# Patient Record
Sex: FEMALE | Race: WHITE | NOT HISPANIC OR LATINO | Employment: UNEMPLOYED | URBAN - METROPOLITAN AREA
[De-identification: names, ages, dates, MRNs, and addresses within clinical notes are randomized per-mention and may not be internally consistent; named-entity substitution may affect disease eponyms.]

---

## 2021-11-15 ENCOUNTER — PRE-ADMISSION TESTING (OUTPATIENT)
Dept: ADMISSIONS | Facility: MEDICAL CENTER | Age: 46
End: 2021-11-15
Attending: PLASTIC SURGERY
Payer: COMMERCIAL

## 2021-11-15 RX ORDER — METOPROLOL TARTRATE 37.5 MG/1
TABLET, FILM COATED ORAL
COMMUNITY

## 2021-11-15 RX ORDER — MELOXICAM 15 MG/1
15 TABLET ORAL DAILY
COMMUNITY

## 2021-11-15 RX ORDER — FUROSEMIDE 20 MG/1
20 TABLET ORAL EVERY MORNING
COMMUNITY

## 2021-11-15 RX ORDER — CHLORAL HYDRATE 500 MG
1000 CAPSULE ORAL DAILY
COMMUNITY

## 2021-11-24 ENCOUNTER — PRE-ADMISSION TESTING (OUTPATIENT)
Dept: ADMISSIONS | Facility: MEDICAL CENTER | Age: 46
End: 2021-11-24
Attending: PLASTIC SURGERY
Payer: COMMERCIAL

## 2021-11-24 DIAGNOSIS — Z01.812 PRE-OPERATIVE LABORATORY EXAMINATION: ICD-10-CM

## 2021-11-24 LAB
ANION GAP SERPL CALC-SCNC: 11 MMOL/L (ref 7–16)
BUN SERPL-MCNC: 19 MG/DL (ref 8–22)
CALCIUM SERPL-MCNC: 9.2 MG/DL (ref 8.5–10.5)
CHLORIDE SERPL-SCNC: 104 MMOL/L (ref 96–112)
CO2 SERPL-SCNC: 23 MMOL/L (ref 20–33)
CREAT SERPL-MCNC: 0.7 MG/DL (ref 0.5–1.4)
GLUCOSE SERPL-MCNC: 94 MG/DL (ref 65–99)
POTASSIUM SERPL-SCNC: 4.3 MMOL/L (ref 3.6–5.5)
SARS-COV-2 RNA RESP QL NAA+PROBE: NOTDETECTED
SODIUM SERPL-SCNC: 138 MMOL/L (ref 135–145)
SPECIMEN SOURCE: NORMAL

## 2021-11-24 PROCEDURE — 36415 COLL VENOUS BLD VENIPUNCTURE: CPT

## 2021-11-24 PROCEDURE — U0005 INFEC AGEN DETEC AMPLI PROBE: HCPCS

## 2021-11-24 PROCEDURE — U0003 INFECTIOUS AGENT DETECTION BY NUCLEIC ACID (DNA OR RNA); SEVERE ACUTE RESPIRATORY SYNDROME CORONAVIRUS 2 (SARS-COV-2) (CORONAVIRUS DISEASE [COVID-19]), AMPLIFIED PROBE TECHNIQUE, MAKING USE OF HIGH THROUGHPUT TECHNOLOGIES AS DESCRIBED BY CMS-2020-01-R: HCPCS

## 2021-11-24 PROCEDURE — 80048 BASIC METABOLIC PNL TOTAL CA: CPT

## 2021-11-24 PROCEDURE — C9803 HOPD COVID-19 SPEC COLLECT: HCPCS

## 2021-11-30 ENCOUNTER — ANESTHESIA EVENT (OUTPATIENT)
Dept: SURGERY | Facility: MEDICAL CENTER | Age: 46
End: 2021-11-30
Payer: COMMERCIAL

## 2021-11-30 ENCOUNTER — ANESTHESIA (OUTPATIENT)
Dept: SURGERY | Facility: MEDICAL CENTER | Age: 46
End: 2021-11-30
Payer: COMMERCIAL

## 2021-11-30 ENCOUNTER — HOSPITAL ENCOUNTER (OUTPATIENT)
Facility: MEDICAL CENTER | Age: 46
End: 2021-11-30
Attending: PLASTIC SURGERY | Admitting: PLASTIC SURGERY
Payer: COMMERCIAL

## 2021-11-30 VITALS
TEMPERATURE: 97.7 F | WEIGHT: 293 LBS | BODY MASS INDEX: 47.09 KG/M2 | SYSTOLIC BLOOD PRESSURE: 168 MMHG | HEIGHT: 66 IN | OXYGEN SATURATION: 98 % | RESPIRATION RATE: 18 BRPM | DIASTOLIC BLOOD PRESSURE: 75 MMHG | HEART RATE: 81 BPM

## 2021-11-30 LAB
ERYTHROCYTE [DISTWIDTH] IN BLOOD BY AUTOMATED COUNT: 49.1 FL (ref 35.9–50)
HCG UR QL: NEGATIVE
HCT VFR BLD AUTO: 41.3 % (ref 37–47)
HGB BLD-MCNC: 13.5 G/DL (ref 12–16)
MCH RBC QN AUTO: 30.6 PG (ref 27–33)
MCHC RBC AUTO-ENTMCNC: 32.7 G/DL (ref 33.6–35)
MCV RBC AUTO: 93.7 FL (ref 81.4–97.8)
PLATELET # BLD AUTO: 378 K/UL (ref 164–446)
PMV BLD AUTO: 9.8 FL (ref 9–12.9)
RBC # BLD AUTO: 4.41 M/UL (ref 4.2–5.4)
WBC # BLD AUTO: 9.1 K/UL (ref 4.8–10.8)

## 2021-11-30 PROCEDURE — 160002 HCHG RECOVERY MINUTES (STAT): Performed by: PLASTIC SURGERY

## 2021-11-30 PROCEDURE — 700105 HCHG RX REV CODE 258: Performed by: PLASTIC SURGERY

## 2021-11-30 PROCEDURE — 501838 HCHG SUTURE GENERAL: Performed by: PLASTIC SURGERY

## 2021-11-30 PROCEDURE — 700101 HCHG RX REV CODE 250: Performed by: ANESTHESIOLOGY

## 2021-11-30 PROCEDURE — 160048 HCHG OR STATISTICAL LEVEL 1-5: Performed by: PLASTIC SURGERY

## 2021-11-30 PROCEDURE — 700102 HCHG RX REV CODE 250 W/ 637 OVERRIDE(OP): Performed by: ANESTHESIOLOGY

## 2021-11-30 PROCEDURE — 160041 HCHG SURGERY MINUTES - EA ADDL 1 MIN LEVEL 4: Performed by: PLASTIC SURGERY

## 2021-11-30 PROCEDURE — 302699 HCHG ABDOMINAL BINDER: Performed by: PLASTIC SURGERY

## 2021-11-30 PROCEDURE — 700111 HCHG RX REV CODE 636 W/ 250 OVERRIDE (IP): Performed by: PLASTIC SURGERY

## 2021-11-30 PROCEDURE — 700111 HCHG RX REV CODE 636 W/ 250 OVERRIDE (IP): Performed by: ANESTHESIOLOGY

## 2021-11-30 PROCEDURE — 160009 HCHG ANES TIME/MIN: Performed by: PLASTIC SURGERY

## 2021-11-30 PROCEDURE — 81025 URINE PREGNANCY TEST: CPT

## 2021-11-30 PROCEDURE — 700101 HCHG RX REV CODE 250: Performed by: PLASTIC SURGERY

## 2021-11-30 PROCEDURE — 85027 COMPLETE CBC AUTOMATED: CPT

## 2021-11-30 PROCEDURE — 160035 HCHG PACU - 1ST 60 MINS PHASE I: Performed by: PLASTIC SURGERY

## 2021-11-30 PROCEDURE — A9270 NON-COVERED ITEM OR SERVICE: HCPCS | Performed by: ANESTHESIOLOGY

## 2021-11-30 PROCEDURE — 160029 HCHG SURGERY MINUTES - 1ST 30 MINS LEVEL 4: Performed by: PLASTIC SURGERY

## 2021-11-30 PROCEDURE — 160046 HCHG PACU - 1ST 60 MINS PHASE II: Performed by: PLASTIC SURGERY

## 2021-11-30 PROCEDURE — 160025 RECOVERY II MINUTES (STATS): Performed by: PLASTIC SURGERY

## 2021-11-30 RX ORDER — OXYCODONE HYDROCHLORIDE AND ACETAMINOPHEN 5; 325 MG/1; MG/1
1 TABLET ORAL
Status: COMPLETED | OUTPATIENT
Start: 2021-11-30 | End: 2021-11-30

## 2021-11-30 RX ORDER — OXYCODONE HYDROCHLORIDE AND ACETAMINOPHEN 5; 325 MG/1; MG/1
2 TABLET ORAL
Status: COMPLETED | OUTPATIENT
Start: 2021-11-30 | End: 2021-11-30

## 2021-11-30 RX ORDER — HYDROMORPHONE HYDROCHLORIDE 1 MG/ML
0.4 INJECTION, SOLUTION INTRAMUSCULAR; INTRAVENOUS; SUBCUTANEOUS
Status: DISCONTINUED | OUTPATIENT
Start: 2021-11-30 | End: 2021-11-30 | Stop reason: HOSPADM

## 2021-11-30 RX ORDER — SODIUM CHLORIDE, SODIUM LACTATE, POTASSIUM CHLORIDE, CALCIUM CHLORIDE 600; 310; 30; 20 MG/100ML; MG/100ML; MG/100ML; MG/100ML
INJECTION, SOLUTION INTRAVENOUS CONTINUOUS
Status: DISCONTINUED | OUTPATIENT
Start: 2021-11-30 | End: 2021-11-30 | Stop reason: HOSPADM

## 2021-11-30 RX ORDER — TRANEXAMIC ACID 100 MG/ML
INJECTION, SOLUTION INTRAVENOUS PRN
Status: DISCONTINUED | OUTPATIENT
Start: 2021-11-30 | End: 2021-11-30 | Stop reason: SURG

## 2021-11-30 RX ORDER — LABETALOL HYDROCHLORIDE 5 MG/ML
5 INJECTION, SOLUTION INTRAVENOUS
Status: DISCONTINUED | OUTPATIENT
Start: 2021-11-30 | End: 2021-11-30 | Stop reason: HOSPADM

## 2021-11-30 RX ORDER — ROCURONIUM BROMIDE 10 MG/ML
INJECTION, SOLUTION INTRAVENOUS PRN
Status: DISCONTINUED | OUTPATIENT
Start: 2021-11-30 | End: 2021-11-30 | Stop reason: SURG

## 2021-11-30 RX ORDER — LIDOCAINE HYDROCHLORIDE 10 MG/ML
INJECTION, SOLUTION INFILTRATION; PERINEURAL
Status: DISCONTINUED
Start: 2021-11-30 | End: 2021-11-30 | Stop reason: HOSPADM

## 2021-11-30 RX ORDER — LIDOCAINE HYDROCHLORIDE 10 MG/ML
INJECTION, SOLUTION EPIDURAL; INFILTRATION; INTRACAUDAL; PERINEURAL
Status: DISCONTINUED
Start: 2021-11-30 | End: 2021-11-30 | Stop reason: HOSPADM

## 2021-11-30 RX ORDER — HYDRALAZINE HYDROCHLORIDE 20 MG/ML
5 INJECTION INTRAMUSCULAR; INTRAVENOUS
Status: DISCONTINUED | OUTPATIENT
Start: 2021-11-30 | End: 2021-11-30 | Stop reason: HOSPADM

## 2021-11-30 RX ORDER — HALOPERIDOL 5 MG/ML
1 INJECTION INTRAMUSCULAR
Status: DISCONTINUED | OUTPATIENT
Start: 2021-11-30 | End: 2021-11-30 | Stop reason: HOSPADM

## 2021-11-30 RX ORDER — DEXAMETHASONE SODIUM PHOSPHATE 4 MG/ML
INJECTION, SOLUTION INTRA-ARTICULAR; INTRALESIONAL; INTRAMUSCULAR; INTRAVENOUS; SOFT TISSUE PRN
Status: DISCONTINUED | OUTPATIENT
Start: 2021-11-30 | End: 2021-11-30 | Stop reason: SURG

## 2021-11-30 RX ORDER — METOPROLOL TARTRATE 1 MG/ML
1 INJECTION, SOLUTION INTRAVENOUS
Status: DISCONTINUED | OUTPATIENT
Start: 2021-11-30 | End: 2021-11-30 | Stop reason: HOSPADM

## 2021-11-30 RX ORDER — HYDROMORPHONE HYDROCHLORIDE 1 MG/ML
0.2 INJECTION, SOLUTION INTRAMUSCULAR; INTRAVENOUS; SUBCUTANEOUS
Status: DISCONTINUED | OUTPATIENT
Start: 2021-11-30 | End: 2021-11-30 | Stop reason: HOSPADM

## 2021-11-30 RX ORDER — EPINEPHRINE 1 MG/ML(1)
AMPUL (ML) INJECTION
Status: DISCONTINUED
Start: 2021-11-30 | End: 2021-11-30 | Stop reason: HOSPADM

## 2021-11-30 RX ORDER — ONDANSETRON 2 MG/ML
INJECTION INTRAMUSCULAR; INTRAVENOUS PRN
Status: DISCONTINUED | OUTPATIENT
Start: 2021-11-30 | End: 2021-11-30 | Stop reason: SURG

## 2021-11-30 RX ORDER — MIDAZOLAM HYDROCHLORIDE 1 MG/ML
INJECTION INTRAMUSCULAR; INTRAVENOUS PRN
Status: DISCONTINUED | OUTPATIENT
Start: 2021-11-30 | End: 2021-11-30

## 2021-11-30 RX ORDER — MEPERIDINE HYDROCHLORIDE 25 MG/ML
12.5 INJECTION INTRAMUSCULAR; INTRAVENOUS; SUBCUTANEOUS
Status: DISCONTINUED | OUTPATIENT
Start: 2021-11-30 | End: 2021-11-30 | Stop reason: HOSPADM

## 2021-11-30 RX ORDER — DIPHENHYDRAMINE HYDROCHLORIDE 50 MG/ML
12.5 INJECTION INTRAMUSCULAR; INTRAVENOUS
Status: DISCONTINUED | OUTPATIENT
Start: 2021-11-30 | End: 2021-11-30 | Stop reason: HOSPADM

## 2021-11-30 RX ORDER — SUCCINYLCHOLINE CHLORIDE 20 MG/ML
INJECTION INTRAMUSCULAR; INTRAVENOUS PRN
Status: DISCONTINUED | OUTPATIENT
Start: 2021-11-30 | End: 2021-11-30 | Stop reason: SURG

## 2021-11-30 RX ORDER — HYDROMORPHONE HYDROCHLORIDE 1 MG/ML
0.1 INJECTION, SOLUTION INTRAMUSCULAR; INTRAVENOUS; SUBCUTANEOUS
Status: DISCONTINUED | OUTPATIENT
Start: 2021-11-30 | End: 2021-11-30 | Stop reason: HOSPADM

## 2021-11-30 RX ORDER — CEFAZOLIN SODIUM 1 G/3ML
INJECTION, POWDER, FOR SOLUTION INTRAMUSCULAR; INTRAVENOUS PRN
Status: DISCONTINUED | OUTPATIENT
Start: 2021-11-30 | End: 2021-11-30 | Stop reason: SURG

## 2021-11-30 RX ORDER — MIDAZOLAM HYDROCHLORIDE 1 MG/ML
INJECTION INTRAMUSCULAR; INTRAVENOUS PRN
Status: DISCONTINUED | OUTPATIENT
Start: 2021-11-30 | End: 2021-11-30 | Stop reason: SURG

## 2021-11-30 RX ADMIN — TRANEXAMIC ACID 1300 MG: 100 INJECTION, SOLUTION INTRAVENOUS at 08:00

## 2021-11-30 RX ADMIN — PROPOFOL 200 MG: 10 INJECTION, EMULSION INTRAVENOUS at 07:37

## 2021-11-30 RX ADMIN — CEFAZOLIN 3 G: 330 INJECTION, POWDER, FOR SOLUTION INTRAMUSCULAR; INTRAVENOUS at 07:46

## 2021-11-30 RX ADMIN — ONDANSETRON 4 MG: 2 INJECTION INTRAMUSCULAR; INTRAVENOUS at 08:45

## 2021-11-30 RX ADMIN — ROCURONIUM BROMIDE 5 MG: 10 INJECTION, SOLUTION INTRAVENOUS at 07:37

## 2021-11-30 RX ADMIN — FENTANYL CITRATE 50 MCG: 50 INJECTION, SOLUTION INTRAMUSCULAR; INTRAVENOUS at 08:18

## 2021-11-30 RX ADMIN — SODIUM CHLORIDE, POTASSIUM CHLORIDE, SODIUM LACTATE AND CALCIUM CHLORIDE: 600; 310; 30; 20 INJECTION, SOLUTION INTRAVENOUS at 07:35

## 2021-11-30 RX ADMIN — OXYCODONE HYDROCHLORIDE AND ACETAMINOPHEN 2 TABLET: 5; 325 TABLET ORAL at 09:10

## 2021-11-30 RX ADMIN — FENTANYL CITRATE 25 MCG: 50 INJECTION, SOLUTION INTRAMUSCULAR; INTRAVENOUS at 09:20

## 2021-11-30 RX ADMIN — SUCCINYLCHOLINE CHLORIDE 160 MG: 20 INJECTION, SOLUTION INTRAMUSCULAR; INTRAVENOUS; PARENTERAL at 07:37

## 2021-11-30 RX ADMIN — MIDAZOLAM HYDROCHLORIDE 2 MG: 1 INJECTION, SOLUTION INTRAMUSCULAR; INTRAVENOUS at 07:32

## 2021-11-30 RX ADMIN — FENTANYL CITRATE 100 MCG: 50 INJECTION, SOLUTION INTRAMUSCULAR; INTRAVENOUS at 07:37

## 2021-11-30 RX ADMIN — MEPERIDINE HYDROCHLORIDE 12.5 MG: 25 INJECTION INTRAMUSCULAR; INTRAVENOUS; SUBCUTANEOUS at 09:25

## 2021-11-30 RX ADMIN — DEXAMETHASONE SODIUM PHOSPHATE 8 MG: 4 INJECTION, SOLUTION INTRA-ARTICULAR; INTRALESIONAL; INTRAMUSCULAR; INTRAVENOUS; SOFT TISSUE at 07:34

## 2021-11-30 RX ADMIN — FENTANYL CITRATE 25 MCG: 50 INJECTION, SOLUTION INTRAMUSCULAR; INTRAVENOUS at 09:14

## 2021-11-30 RX ADMIN — FENTANYL CITRATE 25 MCG: 50 INJECTION, SOLUTION INTRAMUSCULAR; INTRAVENOUS at 09:25

## 2021-11-30 ASSESSMENT — PAIN SCALES - GENERAL: PAIN_LEVEL: 2

## 2021-11-30 ASSESSMENT — PAIN DESCRIPTION - PAIN TYPE
TYPE: SURGICAL PAIN
TYPE: CHRONIC PAIN
TYPE: SURGICAL PAIN

## 2021-11-30 NOTE — ANESTHESIA PREPROCEDURE EVALUATION
Case: [id number] Date/Time: 11/30/21 0715    Procedure: LIPOSUCTION - TO CALVES (Bilateral )    Pre-op diagnosis: LIPODYSTROPHY    Location: [facility] ROOM 25 / SURGERY SAME DAY [facility]    Surgeons: Donal Richter M.D.          Relevant Problems   No relevant active problems       Physical Exam    Airway   Mallampati: II  TM distance: >3 FB  Neck ROM: full       Cardiovascular - normal exam  Rhythm: regular  Rate: normal  (-) murmur     Dental - normal exam           Pulmonary - normal exam  Breath sounds clear to auscultation     Abdominal    Neurological - normal exam                 Anesthesia Plan    ASA 2       Plan - general       Airway plan will be ETT          Induction: intravenous    Postoperative Plan: Postoperative administration of opioids is intended.    Pertinent diagnostic labs and testing reviewed    Informed Consent:    Anesthetic plan and risks discussed with patient.    Use of blood products discussed with: patient whom consented to blood products.

## 2021-11-30 NOTE — DISCHARGE INSTRUCTIONS
ACTIVITY: Rest and take it easy for the first 24 hours.  A responsible adult is recommended to remain with you during that time.  It is normal to feel sleepy.  We encourage you to not do anything that requires balance, judgment or coordination.    MILD FLU-LIKE SYMPTOMS ARE NORMAL. YOU MAY EXPERIENCE GENERALIZED MUSCLE ACHES, THROAT IRRITATION, HEADACHE AND/OR SOME NAUSEA.    FOR 24 HOURS DO NOT:  Drive, operate machinery or run household appliances.  Drink beer or alcoholic beverages.   Make important decisions or sign legal documents.    SPECIAL INSTRUCTIONS:    Comment    Activity Level: Limited activities    Activity: Ambulate With Assistance    How Often to Ambulate Minimum 3x/day    Restricted weight-bearing extremity: No weight-bearing restrictions          DIET: To avoid nausea, slowly advance diet as tolerated, avoiding spicy or greasy foods for the first day.  Add more substantial food to your diet according to your physician's instructions.  Babies can be fed formula or breast milk as soon as they are hungry.  INCREASE FLUIDS AND FIBER TO AVOID CONSTIPATION.    SURGICAL DRESSING/BATHING: Keep dressings clean dry intact until cleared by the doctor.     FOLLOW-UP APPOINTMENT:  A follow-up appointment should be arranged with your doctor in [phone]; call to schedule.    You should CALL YOUR PHYSICIAN if you develop:  Fever greater than 101 degrees F.  Pain not relieved by medication, or persistent nausea or vomiting.  Excessive bleeding (blood soaking through dressing) or unexpected drainage from the wound.  Extreme redness or swelling around the incision site, drainage of pus or foul smelling drainage.  Inability to urinate or empty your bladder within 8 hours.  Problems with breathing or chest pain.    You should call 911 if you develop problems with breathing or chest pain.  If you are unable to contact your doctor or surgical center, you should go to the nearest emergency room or urgent care center.   Physician's telephone #: Dr. Richter [phone]    If any questions arise, call your doctor.  If your doctor is not available, please feel free to call the Surgical Center at [phone]. The Contact Center is open Monday through Friday 7AM to 5PM and may speak to a nurse at [phone], or toll free at [phone].     A registered nurse may call you a few days after your surgery to see how you are doing after your procedure.    MEDICATIONS: Resume taking daily medication.  Take prescribed pain medication with food.  If no medication is prescribed, you may take non-aspirin pain medication if needed.  PAIN MEDICATION CAN BE VERY CONSTIPATING.  Take a stool softener or laxative such as senokot, pericolace, or milk of magnesia if needed.    Prescription given for none.  Last pain medication given at 9:10 am    If your physician has prescribed pain medication that includes Acetaminophen (Tylenol), do not take additional Acetaminophen (Tylenol) while taking the prescribed medication.    Depression / Suicide Risk    As you are discharged from this [facility] facility, it is important to learn how to keep safe from harming yourself.    Recognize the warning signs:  · Abrupt changes in personality, positive or negative- including increase in energy   · Giving away possessions  · Change in eating patterns- significant weight changes-  positive or negative  · Change in sleeping patterns- unable to sleep or sleeping all the time   · Unwillingness or inability to communicate  · Depression  · Unusual sadness, discouragement and loneliness  · Talk of wanting to die  · Neglect of personal appearance   · Rebelliousness- reckless behavior  · Withdrawal from people/activities they love  · Confusion- inability to concentrate     If you or a loved one observes any of these behaviors or has concerns about self-harm, here's what you can do:  · Talk about it- your feelings and reasons for harming yourself  · Remove any means  that you might use to hurt yourself (examples: pills, rope, extension cords, firearm)  · Get professional help from the community (Mental Health, Substance Abuse, psychological counseling)  · Do not be alone:Call your Safe Contact- someone whom you trust who will be there for you.  · Call your local CRISIS HOTLINE 793-6448 or 303-174-0639  · Call your local Children's Mobile Crisis Response Team [place] (086) 677-0665 or www.Loom  · Call the toll free National Suicide Prevention Hotlines   · National Suicide Prevention Lifeline 104-620-WTIV (6019)  · National Hope Line Network 800-SUICIDE (859-8989)

## 2021-11-30 NOTE — OR NURSING
0906 - Patient arrived from PACU to phase II area. Report received from MARCEL Calderon. Patient assisted to recliner, steady gait.   Bilateral LE acewrap dressings Clean/dry/intact. BLE sensation intact     1008 - Patient spouse, [place] at bedside. Discharge instructions given to patient and patient spouse. All questions answered     1014 - IV removed     1019 - All discharge criteria met. Patient escorted out in stable condition w/ all belongings.   Patient discharged

## 2021-11-30 NOTE — ANESTHESIA TIME REPORT
Anesthesia Start and Stop Event Times     Date Time Event    11/30/2021 0717 Ready for Procedure     0732 Anesthesia Start     0906 Anesthesia Stop        Responsible Staff  11/30/21    Name Role Begin End    Corey Perla D.O. Anesth 0732 0906        Preop Diagnosis (Free Text):  Pre-op Diagnosis     LIPODYSTROPHY        Preop Diagnosis (Codes):    Premium Reason  Non-Premium    Comments:

## 2021-11-30 NOTE — OR NURSING
0901 Patient arrived from OR post liposuction calves. Patient awake. Report received. Not in distress. Vital signs stable.  0910 oxycodone given for pain.  0921 Patient tolerating po intake.   0947  updated on plan of care.   0958 Criteria met to transition patient to stage 2 revery. Report given to Dwight ROD all questions answered.

## 2021-11-30 NOTE — ANESTHESIA POSTPROCEDURE EVALUATION
Patient: Roxie Mendoza    Procedure Summary     Date: 11/30/21 Room / Location: [facility] ROOM 25 / SURGERY SAME DAY [facility]    Anesthesia Start: 0732 Anesthesia Stop: 0906    Procedure: LIPOSUCTION - TO CALVES (Bilateral Leg Lower) Diagnosis: (LIPODYSTROPHY)    Surgeons: Donal Richter M.D. Responsible Provider: Corey Peral D.O.    Anesthesia Type: general ASA Status: 2          Final Anesthesia Type: general  Last vitals  BP   Blood Pressure: 150/92    Temp   36.6 °C (97.9 °F)    Pulse   68   Resp   19    SpO2   97 %      Anesthesia Post Evaluation    Patient location during evaluation: PACU  Patient participation: complete - patient participated  Level of consciousness: awake and alert  Pain score: 2    Airway patency: patent  Anesthetic complications: no  Cardiovascular status: hemodynamically stable  Respiratory status: acceptable  Hydration status: euvolemic    PONV: none          No complications documented.     Nurse Pain Score: 1 (NPRS)

## 2021-11-30 NOTE — OP REPORT
PREOPERATIVE DIAGNOSIS:     Lipodystrophy of bilateral lower extremities/lipedema  POSTOPERATIVE DIAGNOSIS:  Same    PROCEDURE PERFORMED:      liposuction of the bilateral lower extremities (below knee)    ANESTHESIA:  General    COMPLICATIONS:  None    BRIEF HISTORY:  This is a healthy female who  desires correction of her bilateral lower extremity lipedema.   She is healthy and desires this elective surgery. I discussed with her the proposed procedures, options, and incisions as well as any possible complications including but not limited to bleeding, loss of skin, excess scarring, no improvement, lymphedema, asymmetry, need for further surgery, damage to blood vessels and nerves, deep venous thrombosis, pulmonary embolism, infection, and even death. She understands and wishes to proceed. I have again discussed the procedure and risks with the patient preop and marked her accordingly as well. I also reiterated that there is a liposuction max limit of 5liters and she may require a staged procedure to avoid over suctioning.          PROCEDURE:   After placing the patient prone on the operating table and adequately obtaining general endotracheal anesthesia, the patient was prepped and draped in the appropriate sterile fashion. All pressure points were adequately buttressed and cushioned. Calf pneumatics were placed preop prior to anesthesia.    Subsequently attention was directed to the posterior calf area whereupon approximately 1000cc of wetting solution (each liter of Lactated Ringers was mixed 30 ml of 1% Xylocaine to 1:1000 epinephrine) was infiltrated on each posterior calf with another 1000cc infiltrated into each anterior lower extremity for a total of 4 liters of tumescent infiltration. This was done through two 2mm incisions at the superior calf anteriorly and posteriorly.    Next attention was directed to the posterior calf. A total of 1.5 liters of lipoaspirate was obtained using a 3 mm multi-hole  cannula. Liposuction was performed in the deep plane to avoid contour deformities.600cc of lipoaspirate were obtained from each anterior calf respectively.  Total lipoaspirate for the case was 4100cc. The lipoaspirate was bright yellow with very little sanginous aspirate. The patient tolerated the procedure well. There were no intraoperative complications noted. Sponge and needle counts were correct x 2.  She was gently wrapped in 6inch case and topifoam. She was extubated and taken to the recovery room with vital signs stable.

## 2021-11-30 NOTE — OR SURGEON
Immediate Post OP Note    PreOp Diagnosis: lipedema lower extremities bilaterally      PostOp Diagnosis: lipedema lower extremities bilaterally      Procedure(s):  LIPOSUCTION - TO CALVES - Wound Class: Clean    Surgeon(s):  Donal Richter M.D.    Anesthesiologist/Type of Anesthesia:  Anesthesiologist: Corey Perla D.O./General    Surgical Staff:  Circulator: Gabriella Lr R.N.  Scrub Person: Marie Lemons; Cristina Paiz    Specimens removed if any:  * No specimens in log *    Estimated Blood Loss: 50cc    Findings: as above    Complications: none        11/30/2021 8:59 AM Donal Richter M.D.

## 2021-11-30 NOTE — ANESTHESIA PROCEDURE NOTES
Airway    Date/Time: 11/30/2021 7:39 AM  Performed by: Corey Perla D.O.  Authorized by: Corey Perla D.O.     Location:  OR  Urgency:  Elective  Indications for Airway Management:  Anesthesia      Spontaneous Ventilation: absent    Sedation Level:  Deep  Preoxygenated: Yes    Patient Position:  Sniffing  Mask Difficulty Assessment:  1 - vent by mask  Final Airway Type:  Endotracheal airway  Final Endotracheal Airway:  ETT  Cuffed: Yes    Technique Used for Successful ETT Placement:  Direct laryngoscopy    Insertion Site:  Oral  Blade Type:  Toribio  Laryngoscope Blade/Videolaryngoscope Blade Size:  3  ETT Size (mm):  7.5  Measured from:  Lips  ETT to Lips (cm):  21  Placement Verified by: auscultation and capnometry    Cormack-Lehane Classification:  Grade I - full view of glottis  Number of Attempts at Approach:  1

## 2022-05-03 ENCOUNTER — PHYSICAL THERAPY (OUTPATIENT)
Dept: PHYSICAL THERAPY | Facility: REHABILITATION | Age: 47
End: 2022-05-03
Attending: NURSE PRACTITIONER
Payer: COMMERCIAL

## 2022-05-03 DIAGNOSIS — R60.9 EDEMA, UNSPECIFIED TYPE: ICD-10-CM

## 2022-05-03 PROCEDURE — 97161 PT EVAL LOW COMPLEX 20 MIN: CPT

## 2022-05-03 NOTE — OP THERAPY EVALUATION
Outpatient Physical Therapy  LYMPHEDEMA THERAPY INITIAL EVALUATION    [facility] [address].  [address]  [place] [address]  Phone:  [phone]  Fax:  [phone]    Date of Evaluation: 2022    Patient: Ivan Mendoza  YOB: 1975  MRN: [id number]     Referring Provider: [address]  [facility] [address]  [place] [address]   Referring Diagnosis Edema, unspecified [R60.9]     Time Calculation    Start time: 1115  Stop time: 1202 Time Calculation (min): 47 minutes             Chief Complaint: Lipolymphedema    Visit Diagnoses     ICD-10-CM   1. Edema, unspecified type  R60.9       Subjective:   History of Present Illness:     Mechanism of injury:  Pt reporting she has lipedema and had liposuction to B LE in November and removed 5L of tissue from each calf. Her next liposuction treatment is scheduled for 22. She seeking intervention for post-op compression and fluid reduction.   Patient Goals:     Patient goals for therapy:  Decreased edema      Past Medical History:   Diagnosis Date   • Arm numbness 2010   • Disorder of thyroid     nodules   • History of migraine headaches    • Lipedema    • Pain 11/15/2021    bilat legs, 3/10   • PCOS (polycystic ovarian syndrome)    • Sleep apnea     CPAP   • Snoring      Past Surgical History:   Procedure Laterality Date   • LIPOSUCTION Bilateral 2021    Procedure: LIPOSUCTION - TO CALVES;  Surgeon: Donal Richter M.D.;  Location: SURGERY SAME DAY [facility];  Service: Plastics   • OVARIAN CYSTECTOMY      and ectopic pregnancy   • ADENOIDECTOMY     • APPENDECTOMY     • PRIMARY C SECTION       x 2   • SEPTOPLASTY       Social History     Tobacco Use   • Smoking status: Never Smoker   • Smokeless tobacco: Never Used   Substance Use Topics   • Alcohol use: Yes     Comment: reports 3-6 per week     Family and Occupational History     Socioeconomic History   • Marital  status:      Spouse name: Not on file   • Number of children: Not on file   • Years of education: Not on file   • Highest education level: Not on file   Occupational History   • Not on file       Lymphedema Objective    Visit type   Lipolymphedema    Left Lower Extremity Circumferential Measurements  Waist: cm  Hip: cm  Scrotum: cm  Ground/Upper Thigh: cm  Mid Thigh: 67.5 cm  Knee: 55.9 cm  Upper Calf: 70.1 cm  Mid Calf: 59.1 cm  Ankle: 52.5 cm  Heel to Foot: 26.8 cm  Total: 331.9 cm    Right Lower Extremity Circumferential Measurements  Waist: cm  Hip: cm  Scrotum: cm  Ground/Upper Thigh: cm  Mid Thigh: 67.4 cm  Knee: 56.8 cm  Upper Calf: 66.8 cm  Mid Calf: 53.5 cm  Ankle: 49.5 cm  Heel to Foot: 26.6 cm  Total: 320.6 cm    Lymphedema Stage  Stage 1 Lymphedema          Therapeutic Treatments and Modalities:     Therapeutic Treatment and Modalities Summary: Pt reporting she is not interested in lifetime compression but is interested in solutions immediately post-op. Discussed brand and product options as well as Complete Decongestive Therapy including MLD and short-stretch bandages. Pt is willing to participate in anything that will assist with management of her lipolymphedema. DIscussed POC with intention to begin tx after her intervention this month.     Time-based treatments/modalities:           Assessment and Plan:   Functional Impairments: impaired functional mobility, pain with function and swelling    Assessment details:  Pt is a 47yo F with dx of lipedema, noticeable in her teen years. She has undergone liposuction treatment to bilateral calves last year and intends on undergoing an additional intervention for knees this month. Pt suffers from gradual swelling during the day that peaks in early morning prior to reduction. She is seeking treatment for decongestion following her next procedure and will benefit from Complete Decongestive Therapy. Patient has tried elevation, self OTC compression  garments, diuresis and a low sodium diet, all of which were unsuccessful at treating her swelling.  Skilled lymphedema treatment is unable to be carried out by the patient and unskilled caregivers. Services will be provided by a certified lymphedema therapist.  Complete decongestive therapy is considered the gold standard of medical practice for lymphedema treatment and has proven to be effective for reduction in limb volume size and decrease risk of complications secondary to swelling (such as wounds and infections).    Patient to be seen until decongestion occurs. Physical therapy interventions to include manual lymphatic drainage, compression bandaging, skin care education, wound care if applicable, therapeutic exercises, custom garment fitting and lymphedema education to improve skin integrity, decrease lymphedema swelling, improve joint arthrokinematics and range of motion and improve quality of life.    Spontaneous recovery is not expected without skilled completed decongestive lymphedema therapy.    The patient was informed of evaluation findings and intervention plan and agrees to participate in the plan as outlined.  Prognosis: fair      Goals:   Short Term Goals:  1. Pt will achieve a total limb circumference reduction of 5cm in order to decrease risk for infection and progression of disease process.  2. Pt will be independent with self-MLD to facilitate fluid migration to healthy tissues and lymph nodes.   3. Pt will consistently perform exercises with bandages on to facilitate muscle pump of fluid from affected extremity.  Short term goal time span:  2-4 weeks    Long Term Goals:  1. Pt will have a reduction in total limb circumference of 10cm in order to decrease risk for infection and progression of disease process.   2. Patient will be independent with maintenance phase management of lymphedema including: compression garments, skin care education, risk reduction strategies, manual lymphatic drainage,  and therapeutic exercise.  Long term goal time span:  4-6 weeks    Plan:  Therapy options:  Physical therapy treatment to continue  Planned education:  Functional anatomy and physiology of the lymphatic system, pathophysiology of lymphedema, lymphedema exercise, lymphedema precautions, proper skin care/nutrition, compression bandaging, self massage, infection prevention, scar tissue management, activity guidelines, dietary guidelines, skin care guidelines, home pump use, bandage removal and long term self-management of lymphedema  Frequency:  2x week  Duration in weeks:  8  Discussed with:  Patient      Functional Assessment Used    N/A    Referring provider co-signature:  I have reviewed this plan of care and my co-signature certifies the need for services.    Certification Period: 05/03/2022 to  06/28/22    Physician Signature: ________________________________ Date: ______________

## 2022-06-02 ENCOUNTER — PHYSICAL THERAPY (OUTPATIENT)
Dept: PHYSICAL THERAPY | Facility: REHABILITATION | Age: 47
End: 2022-06-02
Attending: NURSE PRACTITIONER
Payer: COMMERCIAL

## 2022-06-02 DIAGNOSIS — R60.9 EDEMA, UNSPECIFIED TYPE: ICD-10-CM

## 2022-06-02 PROCEDURE — 97535 SELF CARE MNGMENT TRAINING: CPT

## 2022-06-02 NOTE — OP THERAPY DAILY TREATMENT
Outpatient Physical Therapy  LYMPHEDEMA THERAPY DAILY TREATMENT     [facility] [address].  [address]  [place] [address]  Phone:  [phone]  Fax:  [phone]    Date: 06/02/2022    Patient: Ivan Mendoza  YOB: 1975  MRN: [id number]     Time Calculation    Start time: 0730  Stop time: 0820 Time Calculation (min): 50 minutes         Chief Complaint: Lipolymphedema    Visit #: 2    Subjective:   History of Present Illness:     Mechanism of injury:  Received liposuction to bilateral lower legs.       Lymphedema Objective    Left Lower Extremity Circumferential Measurements  Waist: cm  Hip: cm  Scrotum: cm  Ground/Upper Thigh: cm  Mid Thigh: 68.4 cm  Knee: 54.8 cm  Upper Calf: 65.1 cm  Mid Calf: 57.5 cm  Ankle: 54 cm  Heel to Foot: 28.4 cm  Total: 328.2 cm    Right Lower Extremity Circumferential Measurements  Waist: cm  Hip: cm  Scrotum: cm  Ground/Upper Thigh: cm  Mid Thigh: 68.7 cm  Knee: 59.4 cm  Upper Calf: 62.2 cm  Mid Calf: 54.3 cm  Ankle: 42.6 cm  Heel to Foot: 27.4 cm  Total: 314.6 cm             Left Lower Extremity Circumferential Measurements EVAL  Waist: cm  Hip: cm  Scrotum: cm  Ground/Upper Thigh: cm  Mid Thigh: 67.5 cm  Knee: 55.9 cm  Upper Calf: 70.1 cm  Mid Calf: 59.1 cm  Ankle: 52.5 cm  Heel to Foot: 26.8 cm  Total: 331.9 cm     Right Lower Extremity Circumferential Measurements EVAL  Waist: cm  Hip: cm  Scrotum: cm  Ground/Upper Thigh: cm  Mid Thigh: 67.4 cm  Knee: 56.8 cm  Upper Calf: 66.8 cm  Mid Calf: 53.5 cm  Ankle: 49.5 cm  Heel to Foot: 26.6 cm  Total: 320.6 cm    Therapeutic Treatments and Modalities:     1. Self Care ADL Training (CPT 57475), Discussed Velcro compression options as well as use of pump and compression leggings. Educated pt and pt's spouse on application of multilayer compression from toes to knees. Ideally, pt would have compression to thigh, but will evaluate response to compression next session.     Time-based  treatments/modalities:    Physical Therapy Timed Treatment Charges  Functional training, self care minutes (CPT 32507): 50 minutes      Assessment and Plan:   Assessment details:  This is patient's first visit following her second bout of liposuction. Discussed compression options and importance of maintaining compression; pt in agreement. Initiated multilayer compression with short-stretch bandages.     Plan:  Therapy options:  Physical therapy treatment to continue  Discussed with:  Patient

## 2022-06-13 ENCOUNTER — APPOINTMENT (OUTPATIENT)
Dept: PHYSICAL THERAPY | Facility: REHABILITATION | Age: 47
End: 2022-06-13
Attending: NURSE PRACTITIONER
Payer: COMMERCIAL

## 2022-06-15 ENCOUNTER — PHYSICAL THERAPY (OUTPATIENT)
Dept: PHYSICAL THERAPY | Facility: REHABILITATION | Age: 47
End: 2022-06-15
Attending: NURSE PRACTITIONER
Payer: COMMERCIAL

## 2022-06-15 DIAGNOSIS — R60.9 LIPEDEMA: ICD-10-CM

## 2022-06-15 PROCEDURE — 97140 MANUAL THERAPY 1/> REGIONS: CPT

## 2022-06-15 NOTE — OP THERAPY DAILY TREATMENT
Outpatient Physical Therapy  LYMPHEDEMA THERAPY DAILY TREATMENT     [facility] [address]  [address] [facility]  [place] [address]  Phone:  [phone]  Fax:  [phone]    Date: 06/15/2022    Patient: Ivan Mendoza  YOB: 1975  MRN: [id number]     Time Calculation    Start time: 0730  Stop time: 0820 Time Calculation (min): 50 minutes         Chief Complaint: Lipolymphedema    Visit #: 3    Subjective:   History of Present Illness:     Mechanism of injury:  Pt reporting she obtained Circaids      Lymphedema Objective    Left Lower Extremity Circumferential Measurements  Waist: cm  Hip: cm  Scrotum: cm  Ground/Upper Thigh: cm  Mid Thigh: 66.2 cm  Knee: 55.7 cm  Upper Calf: 63.9 cm  Mid Calf: 52 cm  Ankle: 45.7 cm  Heel to Foot: 28.3 cm  Total: 311.8 cm    Right Lower Extremity Circumferential Measurements  Waist: cm  Hip: cm  Scrotum: cm  Ground/Upper Thigh: cm  Mid Thigh: 65.6 cm  Knee: 57.8 cm  Upper Calf: 62.7 cm  Mid Calf: 52.1 cm  Ankle: 45.9 cm  Heel to Foot: 27.4 cm  Total: 311.5 cm             Left Lower Extremity Circumferential Measurements 6/2  Waist: cm  Hip: cm  Scrotum: cm  Ground/Upper Thigh: cm  Mid Thigh: 68.4 cm  Knee: 54.8 cm  Upper Calf: 65.1 cm  Mid Calf: 57.5 cm  Ankle: 54 cm  Heel to Foot: 28.4 cm  Total: 328.2 cm     Right Lower Extremity Circumferential Measurements 6/2  Waist: cm  Hip: cm  Scrotum: cm  Ground/Upper Thigh: cm  Mid Thigh: 68.7 cm  Knee: 59.4 cm  Upper Calf: 62.2 cm  Mid Calf: 54.3 cm  Ankle: 42.6 cm  Heel to Foot: 27.4 cm  Total: 314.6 cm     Left Lower Extremity Circumferential Measurements EVAL  Waist: cm  Hip: cm  Scrotum: cm  Ground/Upper Thigh: cm  Mid Thigh: 67.5 cm  Knee: 55.9 cm  Upper Calf: 70.1 cm  Mid Calf: 59.1 cm  Ankle: 52.5 cm  Heel to Foot: 26.8 cm  Total: 331.9 cm     Right Lower Extremity Circumferential Measurements EVAL  Waist: cm  Hip: cm  Scrotum: cm  Ground/Upper Thigh: cm  Mid  Chief Complaint   Patient presents with     Recheck Medication     MDD        Thigh: 67.4 cm  Knee: 56.8 cm  Upper Calf: 66.8 cm  Mid Calf: 53.5 cm  Ankle: 49.5 cm  Heel to Foot: 26.6 cm  Total: 320.6 cm        Therapeutic Treatments and Modalities:     1. Manual Therapy (CPT 93836)    Therapeutic Treatment and Modalities Summary: MLD to B LE without trunk involvement. Focus on B LE.   The purpose of Manual Lymph Drainage (MLD) is to reduce lymph volume in the affected limb by increasing intake of lymphatic load into the lymphatic system, increasing the volume of transported lymph fluid, moving lymph fluid in superficial lymph vessels to collateral lymph collectors, anastomoses, or tissue channels, and increasing venous return. The goal of MLD is to re-route the lymph flow around blocked areas into more centrally located healthy lymph vessels, which drain into the venous system.     Assisted pt with Circaid reduction garment.     Time-based treatments/modalities:    Physical Therapy Timed Treatment Charges  Manual therapy minutes (CPT 23125): 50 minutes      Assessment and Plan:   Assessment details:  Pt has achieved additional reduction to B LE, 17 to L and 3cm to R. She will benefit from further intervention to achieve ongoing decongestion.   Discussed with:  Patient

## 2022-06-20 ENCOUNTER — APPOINTMENT (OUTPATIENT)
Dept: PHYSICAL THERAPY | Facility: REHABILITATION | Age: 47
End: 2022-06-20
Attending: NURSE PRACTITIONER
Payer: COMMERCIAL

## 2022-06-22 ENCOUNTER — APPOINTMENT (OUTPATIENT)
Dept: PHYSICAL THERAPY | Facility: REHABILITATION | Age: 47
End: 2022-06-22
Attending: NURSE PRACTITIONER
Payer: COMMERCIAL

## 2022-06-28 ENCOUNTER — APPOINTMENT (OUTPATIENT)
Dept: PHYSICAL THERAPY | Facility: REHABILITATION | Age: 47
End: 2022-06-28
Attending: NURSE PRACTITIONER
Payer: COMMERCIAL

## 2022-06-30 ENCOUNTER — HOSPITAL ENCOUNTER (OUTPATIENT)
Dept: RADIOLOGY | Facility: MEDICAL CENTER | Age: 47
End: 2022-06-30
Attending: OBSTETRICS & GYNECOLOGY
Payer: COMMERCIAL

## 2022-06-30 ENCOUNTER — APPOINTMENT (OUTPATIENT)
Dept: PHYSICAL THERAPY | Facility: REHABILITATION | Age: 47
End: 2022-06-30
Attending: NURSE PRACTITIONER
Payer: COMMERCIAL

## 2022-06-30 DIAGNOSIS — N93.9 VAGINAL HEMORRHAGE: ICD-10-CM

## 2022-06-30 PROCEDURE — 76830 TRANSVAGINAL US NON-OB: CPT

## 2022-07-05 ENCOUNTER — APPOINTMENT (OUTPATIENT)
Dept: PHYSICAL THERAPY | Facility: REHABILITATION | Age: 47
End: 2022-07-05
Attending: NURSE PRACTITIONER
Payer: COMMERCIAL

## 2022-07-07 ENCOUNTER — APPOINTMENT (OUTPATIENT)
Dept: PHYSICAL THERAPY | Facility: REHABILITATION | Age: 47
End: 2022-07-07
Attending: NURSE PRACTITIONER
Payer: COMMERCIAL

## 2022-07-12 ENCOUNTER — APPOINTMENT (OUTPATIENT)
Dept: PHYSICAL THERAPY | Facility: REHABILITATION | Age: 47
End: 2022-07-12
Attending: NURSE PRACTITIONER
Payer: COMMERCIAL

## 2022-09-16 ENCOUNTER — HOSPITAL ENCOUNTER (OUTPATIENT)
Dept: RADIOLOGY | Facility: MEDICAL CENTER | Age: 47
End: 2022-09-16
Payer: COMMERCIAL

## 2022-09-29 ENCOUNTER — APPOINTMENT (OUTPATIENT)
Dept: RADIOLOGY | Facility: MEDICAL CENTER | Age: 47
End: 2022-09-29
Attending: NURSE PRACTITIONER
Payer: COMMERCIAL

## 2022-11-01 ENCOUNTER — APPOINTMENT (OUTPATIENT)
Dept: RADIOLOGY | Facility: MEDICAL CENTER | Age: 47
End: 2022-11-01
Attending: NURSE PRACTITIONER
Payer: COMMERCIAL

## 2022-12-06 ENCOUNTER — APPOINTMENT (OUTPATIENT)
Dept: RADIOLOGY | Facility: MEDICAL CENTER | Age: 47
End: 2022-12-06
Attending: NURSE PRACTITIONER
Payer: COMMERCIAL

## 2023-05-25 ENCOUNTER — APPOINTMENT (RX ONLY)
Dept: URBAN - METROPOLITAN AREA CLINIC 6 | Facility: CLINIC | Age: 48
Setting detail: DERMATOLOGY
End: 2023-05-25

## 2023-05-25 DIAGNOSIS — L65.8 OTHER SPECIFIED NONSCARRING HAIR LOSS: ICD-10-CM

## 2023-05-25 DIAGNOSIS — E65 LOCALIZED ADIPOSITY: ICD-10-CM

## 2023-05-25 DIAGNOSIS — L82.1 OTHER SEBORRHEIC KERATOSIS: ICD-10-CM

## 2023-05-25 DIAGNOSIS — D18.0 HEMANGIOMA: ICD-10-CM

## 2023-05-25 DIAGNOSIS — D22 MELANOCYTIC NEVI: ICD-10-CM

## 2023-05-25 DIAGNOSIS — Z71.89 OTHER SPECIFIED COUNSELING: ICD-10-CM

## 2023-05-25 DIAGNOSIS — L81.4 OTHER MELANIN HYPERPIGMENTATION: ICD-10-CM

## 2023-05-25 PROBLEM — D18.01 HEMANGIOMA OF SKIN AND SUBCUTANEOUS TISSUE: Status: ACTIVE | Noted: 2023-05-25

## 2023-05-25 PROBLEM — D48.5 NEOPLASM OF UNCERTAIN BEHAVIOR OF SKIN: Status: ACTIVE | Noted: 2023-05-25

## 2023-05-25 PROBLEM — D22.71 MELANOCYTIC NEVI OF RIGHT LOWER LIMB, INCLUDING HIP: Status: ACTIVE | Noted: 2023-05-25

## 2023-05-25 PROBLEM — D22.5 MELANOCYTIC NEVI OF TRUNK: Status: ACTIVE | Noted: 2023-05-25

## 2023-05-25 PROBLEM — D22.62 MELANOCYTIC NEVI OF LEFT UPPER LIMB, INCLUDING SHOULDER: Status: ACTIVE | Noted: 2023-05-25

## 2023-05-25 PROBLEM — D22.72 MELANOCYTIC NEVI OF LEFT LOWER LIMB, INCLUDING HIP: Status: ACTIVE | Noted: 2023-05-25

## 2023-05-25 PROBLEM — D22.61 MELANOCYTIC NEVI OF RIGHT UPPER LIMB, INCLUDING SHOULDER: Status: ACTIVE | Noted: 2023-05-25

## 2023-05-25 PROBLEM — D23.62 OTHER BENIGN NEOPLASM OF SKIN OF LEFT UPPER LIMB, INCLUDING SHOULDER: Status: ACTIVE | Noted: 2023-05-25

## 2023-05-25 PROCEDURE — 11102 TANGNTL BX SKIN SINGLE LES: CPT

## 2023-05-25 PROCEDURE — ? PRESCRIPTION

## 2023-05-25 PROCEDURE — ? PHOTO-DOCUMENTATION

## 2023-05-25 PROCEDURE — 99204 OFFICE O/P NEW MOD 45 MIN: CPT | Mod: 25

## 2023-05-25 PROCEDURE — ? DIAGNOSIS COMMENT

## 2023-05-25 PROCEDURE — ? COUNSELING

## 2023-05-25 PROCEDURE — ? BIOPSY BY SHAVE METHOD

## 2023-05-25 RX ORDER — SPIRONOLACTONE 50 MG/1
1 TABLET, FILM COATED ORAL BID
Qty: 180 | Refills: 0 | Status: ERX | COMMUNITY
Start: 2023-05-25

## 2023-05-25 RX ADMIN — SPIRONOLACTONE 1: 50 TABLET, FILM COATED ORAL at 00:00

## 2023-05-25 ASSESSMENT — LOCATION SIMPLE DESCRIPTION DERM
LOCATION SIMPLE: RIGHT KNEE
LOCATION SIMPLE: LEFT PRETIBIAL REGION
LOCATION SIMPLE: RIGHT THIGH
LOCATION SIMPLE: ABDOMEN
LOCATION SIMPLE: RIGHT PRETIBIAL REGION
LOCATION SIMPLE: RIGHT UPPER ARM
LOCATION SIMPLE: RIGHT FOREARM
LOCATION SIMPLE: LEFT SCALP
LOCATION SIMPLE: LEFT THIGH
LOCATION SIMPLE: LEFT KNEE
LOCATION SIMPLE: CHEST
LOCATION SIMPLE: LEFT UPPER ARM
LOCATION SIMPLE: LEFT FOREARM
LOCATION SIMPLE: LEFT NOSE

## 2023-05-25 ASSESSMENT — LOCATION ZONE DERM
LOCATION ZONE: NOSE
LOCATION ZONE: TRUNK
LOCATION ZONE: SCALP
LOCATION ZONE: ARM
LOCATION ZONE: LEG

## 2023-05-25 ASSESSMENT — LOCATION DETAILED DESCRIPTION DERM
LOCATION DETAILED: LEFT MEDIAL FRONTAL SCALP
LOCATION DETAILED: RIGHT ANTECUBITAL SKIN
LOCATION DETAILED: EPIGASTRIC SKIN
LOCATION DETAILED: LEFT PROXIMAL PRETIBIAL REGION
LOCATION DETAILED: RIGHT ANTERIOR PROXIMAL UPPER ARM
LOCATION DETAILED: LEFT NASAL ALA
LOCATION DETAILED: PERIUMBILICAL SKIN
LOCATION DETAILED: LEFT ANTERIOR PROXIMAL UPPER ARM
LOCATION DETAILED: LOWER STERNUM
LOCATION DETAILED: RIGHT KNEE
LOCATION DETAILED: LEFT VENTRAL PROXIMAL FOREARM
LOCATION DETAILED: RIGHT ANTERIOR DISTAL THIGH
LOCATION DETAILED: LEFT KNEE
LOCATION DETAILED: RIGHT PROXIMAL PRETIBIAL REGION
LOCATION DETAILED: RIGHT ANTERIOR DISTAL UPPER ARM
LOCATION DETAILED: LEFT ANTERIOR DISTAL THIGH
LOCATION DETAILED: LEFT ANTERIOR DISTAL UPPER ARM
LOCATION DETAILED: RIGHT VENTRAL PROXIMAL FOREARM

## 2023-05-25 NOTE — PROCEDURE: DIAGNOSIS COMMENT
Render Risk Assessment In Note?: no
Comment: Gradual pattern hair loss over the past few years. She is interested in starting Spironolactone 50 mg BID in addition to topical minoxidil.\\nFollow up in 6 months and plan to check potassium at that time.
Detail Level: Simple

## 2023-12-11 ENCOUNTER — RX ONLY (OUTPATIENT)
Age: 48
Setting detail: RX ONLY
End: 2023-12-11

## 2023-12-11 RX ORDER — SPIRONOLACTONE 50 MG/1
1 TABLET, FILM COATED ORAL BID
Qty: 60 | Refills: 0 | Status: ERX

## (undated) DEVICE — TOWEL STOP TIMEOUT SAFETY FLAG (40EA/CA)

## (undated) DEVICE — HEAD HOLDER JUNIOR/ADULT

## (undated) DEVICE — SUCTION INSTRUMENT YANKAUER BULBOUS TIP W/O VENT (50EA/CA)

## (undated) DEVICE — LACTATED RINGERS INJ 1000 ML - (14EA/CA 60CA/PF)

## (undated) DEVICE — CLOSURE SKIN STRIP 1/2 X 4 IN - (STERI STRIP) (50/BX 4BX/CA)

## (undated) DEVICE — SODIUM CHL IRRIGATION 0.9% 1000ML (12EA/CA)

## (undated) DEVICE — KIT  I.V. START (100EA/CA)

## (undated) DEVICE — MANIFOLD NEPTUNE 1 PORT (20/PK)

## (undated) DEVICE — SENSOR SPO2 NEO LNCS ADHESIVE (20/BX) SEE USER NOTES

## (undated) DEVICE — ADHESIVE MASTISOL - (48/BX)

## (undated) DEVICE — SLEEVE VASO CALF MED - (10PR/CA)

## (undated) DEVICE — ELECTRODE 850 FOAM ADHESIVE - HYDROGEL RADIOTRNSPRNT (50/PK)

## (undated) DEVICE — TUBE E-T HI-LO CUFF 7.5MM (10EA/PK)

## (undated) DEVICE — CANISTER SUCTION RIGID RED 1500CC (40EA/CA)

## (undated) DEVICE — BINDER ABDOMINAL 45-62 INCH - 4 PANEL 12 IN (1/EA)

## (undated) DEVICE — SET EXTENSION WITH 2 PORTS (48EA/CA) ***PART #2C8610 IS A SUBSTITUTE*****

## (undated) DEVICE — GOWN WARMING STANDARD FLEX - (30/CA)

## (undated) DEVICE — TUBING PAL ASPIRATION LIPOSUC 12FT (5EA/PK)

## (undated) DEVICE — TUBING TUMESENCE - 10/BX

## (undated) DEVICE — TUBE CONNECTING SUCTION - CLEAR PLASTIC STERILE 72 IN (50EA/CA)

## (undated) DEVICE — CHLORAPREP 26 ML APPLICATOR - ORANGE TINT(25/CA)

## (undated) DEVICE — DRESSING TOPIFOAM 8 X 12 (30EA/BX)

## (undated) DEVICE — KIT ANESTHESIA W/CIRCUIT & 3/LT BAG W/FILTER (20EA/CA)

## (undated) DEVICE — CATHETER IV 20 GA X 1-1/4 ---SURG.& SDS ONLY--- (50EA/BX)

## (undated) DEVICE — SUTURE 5-0 PLAIN GUT PC-1 - (12/BX)

## (undated) DEVICE — CANISTER SUCTION 3000ML MECHANICAL FILTER AUTO SHUTOFF MEDI-VAC NONSTERILE LF DISP  (40EA/CA)

## (undated) DEVICE — SUTURE GENERAL

## (undated) DEVICE — TOWELS CLOTH SURGICAL - (4/PK 20PK/CA)

## (undated) DEVICE — SET LEADWIRE 5 LEAD BEDSIDE DISPOSABLE ECG (1SET OF 5/EA)